# Patient Record
Sex: MALE | Race: BLACK OR AFRICAN AMERICAN | Employment: FULL TIME | URBAN - METROPOLITAN AREA
[De-identification: names, ages, dates, MRNs, and addresses within clinical notes are randomized per-mention and may not be internally consistent; named-entity substitution may affect disease eponyms.]

---

## 2023-02-20 ENCOUNTER — APPOINTMENT (OUTPATIENT)
Dept: CT IMAGING | Age: 53
End: 2023-02-20

## 2023-02-20 ENCOUNTER — APPOINTMENT (OUTPATIENT)
Dept: GENERAL RADIOLOGY | Age: 53
End: 2023-02-20

## 2023-02-20 ENCOUNTER — HOSPITAL ENCOUNTER (EMERGENCY)
Age: 53
Discharge: HOME | End: 2023-02-20
Attending: EMERGENCY MEDICINE

## 2023-02-20 VITALS
SYSTOLIC BLOOD PRESSURE: 130 MMHG | HEART RATE: 77 BPM | RESPIRATION RATE: 16 BRPM | WEIGHT: 160 LBS | TEMPERATURE: 98.2 F | BODY MASS INDEX: 24.25 KG/M2 | HEIGHT: 68 IN | DIASTOLIC BLOOD PRESSURE: 89 MMHG | OXYGEN SATURATION: 99 %

## 2023-02-20 DIAGNOSIS — E03.9 HYPOTHYROIDISM, UNSPECIFIED TYPE: Primary | ICD-10-CM

## 2023-02-20 DIAGNOSIS — R20.0 LEFT FACIAL NUMBNESS: ICD-10-CM

## 2023-02-20 LAB
ALBUMIN SERPL-MCNC: 2.8 G/DL (ref 3.5–5)
ALBUMIN/GLOB SERPL: 0.8 (ref 0.4–1.6)
ALP SERPL-CCNC: 45 U/L (ref 50–136)
ALT SERPL-CCNC: 20 U/L (ref 12–65)
ANION GAP SERPL CALC-SCNC: 2 MMOL/L (ref 2–11)
AST SERPL-CCNC: 27 U/L (ref 15–37)
BASOPHILS # BLD: 0.1 K/UL (ref 0–0.2)
BASOPHILS NFR BLD: 2 % (ref 0–2)
BILIRUB SERPL-MCNC: 0.3 MG/DL (ref 0.2–1.1)
BUN SERPL-MCNC: 15 MG/DL (ref 6–23)
CALCIUM SERPL-MCNC: 8.5 MG/DL (ref 8.3–10.4)
CHLORIDE SERPL-SCNC: 109 MMOL/L (ref 101–110)
CO2 SERPL-SCNC: 32 MMOL/L (ref 21–32)
CREAT SERPL-MCNC: 1.5 MG/DL (ref 0.8–1.5)
DIFFERENTIAL METHOD BLD: ABNORMAL
EOSINOPHIL # BLD: 0.2 K/UL (ref 0–0.8)
EOSINOPHIL NFR BLD: 4 % (ref 0.5–7.8)
ERYTHROCYTE [DISTWIDTH] IN BLOOD BY AUTOMATED COUNT: 13.7 % (ref 11.9–14.6)
GLOBULIN SER CALC-MCNC: 3.4 G/DL (ref 2.8–4.5)
GLUCOSE SERPL-MCNC: 111 MG/DL (ref 65–100)
HCT VFR BLD AUTO: 37.6 % (ref 41.1–50.3)
HGB BLD-MCNC: 12.4 G/DL (ref 13.6–17.2)
IMM GRANULOCYTES # BLD AUTO: 0 K/UL (ref 0–0.5)
IMM GRANULOCYTES NFR BLD AUTO: 0 % (ref 0–5)
INR PPP: 0.9
LYMPHOCYTES # BLD: 1.4 K/UL (ref 0.5–4.6)
LYMPHOCYTES NFR BLD: 27 % (ref 13–44)
MCH RBC QN AUTO: 31.5 PG (ref 26.1–32.9)
MCHC RBC AUTO-ENTMCNC: 33 G/DL (ref 31.4–35)
MCV RBC AUTO: 95.4 FL (ref 82–102)
MONOCYTES # BLD: 0.5 K/UL (ref 0.1–1.3)
MONOCYTES NFR BLD: 9 % (ref 4–12)
NEUTS SEG # BLD: 3 K/UL (ref 1.7–8.2)
NEUTS SEG NFR BLD: 58 % (ref 43–78)
NRBC # BLD: 0 K/UL (ref 0–0.2)
PLATELET # BLD AUTO: 301 K/UL (ref 150–450)
PMV BLD AUTO: 9.5 FL (ref 9.4–12.3)
POTASSIUM SERPL-SCNC: 4.5 MMOL/L (ref 3.5–5.1)
PROT SERPL-MCNC: 6.2 G/DL (ref 6.3–8.2)
PROTHROMBIN TIME: 12.1 SEC (ref 12.6–14.3)
RBC # BLD AUTO: 3.94 M/UL (ref 4.23–5.6)
SODIUM SERPL-SCNC: 143 MMOL/L (ref 133–143)
TROPONIN I SERPL HS-MCNC: 14.8 PG/ML (ref 0–14)
TSH, 3RD GENERATION: 22.2 UIU/ML (ref 0.36–3.74)
WBC # BLD AUTO: 5.1 K/UL (ref 4.3–11.1)

## 2023-02-20 PROCEDURE — 85025 COMPLETE CBC W/AUTO DIFF WBC: CPT

## 2023-02-20 PROCEDURE — 71045 X-RAY EXAM CHEST 1 VIEW: CPT

## 2023-02-20 PROCEDURE — 2580000003 HC RX 258: Performed by: EMERGENCY MEDICINE

## 2023-02-20 PROCEDURE — 70450 CT HEAD/BRAIN W/O DYE: CPT

## 2023-02-20 PROCEDURE — 93005 ELECTROCARDIOGRAM TRACING: CPT | Performed by: EMERGENCY MEDICINE

## 2023-02-20 PROCEDURE — 99285 EMERGENCY DEPT VISIT HI MDM: CPT

## 2023-02-20 PROCEDURE — 6360000004 HC RX CONTRAST MEDICATION: Performed by: EMERGENCY MEDICINE

## 2023-02-20 PROCEDURE — 84443 ASSAY THYROID STIM HORMONE: CPT

## 2023-02-20 PROCEDURE — 80053 COMPREHEN METABOLIC PANEL: CPT

## 2023-02-20 PROCEDURE — 84484 ASSAY OF TROPONIN QUANT: CPT

## 2023-02-20 PROCEDURE — 70498 CT ANGIOGRAPHY NECK: CPT

## 2023-02-20 PROCEDURE — 85610 PROTHROMBIN TIME: CPT

## 2023-02-20 RX ORDER — 0.9 % SODIUM CHLORIDE 0.9 %
100 INTRAVENOUS SOLUTION INTRAVENOUS
Status: COMPLETED | OUTPATIENT
Start: 2023-02-20 | End: 2023-02-20

## 2023-02-20 RX ORDER — SODIUM CHLORIDE 0.9 % (FLUSH) 0.9 %
10 SYRINGE (ML) INJECTION
Status: COMPLETED | OUTPATIENT
Start: 2023-02-20 | End: 2023-02-20

## 2023-02-20 RX ORDER — LEVOTHYROXINE SODIUM 0.05 MG/1
50 TABLET ORAL DAILY
Qty: 20 TABLET | Refills: 0 | Status: SHIPPED | OUTPATIENT
Start: 2023-02-20 | End: 2023-03-12

## 2023-02-20 RX ADMIN — SODIUM CHLORIDE 100 ML: 9 INJECTION, SOLUTION INTRAVENOUS at 15:13

## 2023-02-20 RX ADMIN — IOPAMIDOL 50 ML: 755 INJECTION, SOLUTION INTRAVENOUS at 15:12

## 2023-02-20 RX ADMIN — SODIUM CHLORIDE, PRESERVATIVE FREE 10 ML: 5 INJECTION INTRAVENOUS at 15:13

## 2023-02-20 ASSESSMENT — ENCOUNTER SYMPTOMS
BACK PAIN: 0
CONSTIPATION: 0
SHORTNESS OF BREATH: 0
COUGH: 0
DIARRHEA: 0
ABDOMINAL PAIN: 0
VOMITING: 0
COLOR CHANGE: 0
RHINORRHEA: 0
SORE THROAT: 0
NAUSEA: 0
VISUAL CHANGE: 1

## 2023-02-20 ASSESSMENT — LIFESTYLE VARIABLES
HOW MANY STANDARD DRINKS CONTAINING ALCOHOL DO YOU HAVE ON A TYPICAL DAY: PATIENT DOES NOT DRINK
HOW OFTEN DO YOU HAVE A DRINK CONTAINING ALCOHOL: NEVER

## 2023-02-20 ASSESSMENT — PAIN SCALES - GENERAL: PAINLEVEL_OUTOF10: 10

## 2023-02-20 ASSESSMENT — PAIN DESCRIPTION - LOCATION: LOCATION: SHOULDER

## 2023-02-20 ASSESSMENT — PAIN DESCRIPTION - DESCRIPTORS: DESCRIPTORS: JABBING

## 2023-02-20 ASSESSMENT — PAIN DESCRIPTION - ORIENTATION: ORIENTATION: LEFT

## 2023-02-20 NOTE — ED TRIAGE NOTES
Pt arrives via pov ambulatory c/o left facial numbness and right arm tingling. Pt reports unbalanced. All this started a week ago. Denies any neuro hx.  NIH 0.

## 2023-02-20 NOTE — ED TRIAGE NOTES
Patient arrives ambulatory to triage c/o L facial numbness x1 week, along with L eye vision changes. Also reports bilateral intermittent arm weakness, numbness, and pain. Denies headache. A+Ox4. Masked. NAD.

## 2023-02-20 NOTE — ED PROVIDER NOTES
Emergency Department Provider Note                   PCP:                None Provider               Age: 45 y.o. Sex: male       ICD-10-CM    1. Hypothyroidism, unspecified type  E03.9       2. Left facial numbness  R20.0           DISPOSITION Decision To Discharge 02/20/2023 03:48:31 PM       Medical Decision Making  45 y.o. patient  has no past medical history on file. Patient with left facial numbness and alternating right arm and left arm pain and numbness. History of thyroid disease not currently on any medication. No acute on CT or CTA. TSH is elevated. We will treat as such and discharged with medication at home.         Ordered and Reviewed Labs: Labs Reviewed  TROPONIN - Abnormal; Notable for the following components:     Troponin, High Sensitivity    14.8 (*)            All other components within normal limits  PROTIME-INR - Abnormal; Notable for the following components:     Protime                       12.1 (*)            All other components within normal limits  CBC WITH AUTO DIFFERENTIAL - Abnormal; Notable for the following components:      RBC                           3.94 (*)               Hemoglobin                    12.4 (*)               Hematocrit                    37.6 (*)            All other components within normal limits  COMPREHENSIVE METABOLIC PANEL - Abnormal; Notable for the following components:     Glucose                       111 (*)                Est, Glom Filt Rate           55 (*)                 Alk Phosphatase               45 (*)                 Total Protein                 6.2 (*)                Albumin                       2.8 (*)             All other components within normal limits  TSH - Abnormal; Notable for the following components:     TSH, 3RD GENERATION           22.200 (*)            All other components within normal limits    Imaging Independent Interpretation: CT HEAD WO CONTRAST   Final Result        No CT evidence of acute intracranial abnormality. CTA HEAD NECK W CONTRAST   Final Result        No acute arterial occlusive disease. No hemodynamically significant carotid    artery stenosis. XR CHEST 1 VIEW   Final Result        No acute airspace disease. EKG Independent Interpretation: EKG: normal sinus rhythm, nonspecific ST and T waves changes. Reassess: Doing well      Disposition: Discharge    Consideration of rx meds: Synthroid for thyroid. Reviewed current meds and recommend continue current home medications. Final DX: Acute hypothyroidism. Amount and/or Complexity of Data Reviewed  Labs: ordered. Radiology: ordered. ECG/medicine tests: ordered. Risk  Prescription drug management. Orders Placed This Encounter   Procedures    XR CHEST 1 VIEW    CT HEAD WO CONTRAST    CTA HEAD NECK W CONTRAST    Troponin    Protime-INR    CBC with Auto Differential    Comprehensive Metabolic Panel    TSH    EKG 12 Lead        Medications   0.9 % sodium chloride bolus (0 mLs IntraVENous Stopped 2/20/23 1550)   sodium chloride flush 0.9 % injection 10 mL (10 mLs IntraVENous Given 2/20/23 1513)   iopamidol (ISOVUE-370) 76 % injection 50 mL (50 mLs IntraVENous Given 2/20/23 1512)       New Prescriptions    LEVOTHYROXINE (SYNTHROID) 50 MCG TABLET    Take 1 tablet by mouth daily for 20 days        Margarita Ortega is a 45 y.o. male who presents to the Emergency Department with chief complaint of    Chief Complaint   Patient presents with    Extremity Weakness      Patient started 10 days ago with left facial numbness from the bottom of his eye to the top of his upper lip. Does not go to the side of the face or ear. Has been constant during this time. Has some slight blurry vision. No headache. Also complains of alternating right arm and left arm numbness tingling and pain. No specific trigger. With symptoms continuing comes in for evaluation.     The history is provided by the patient. No  was used. Extremity Weakness  Severity:  Mild  Duration:  10 days  Timing:  Intermittent  Progression:  Unchanged  Chronicity:  New  Relieved by:  Nothing  Worsened by:  Nothing  Associated symptoms: extremity numbness (arms intermittently), sensory-motor deficit (left face) and vision change (mild left eye)    Associated symptoms: no abdominal pain, no aphasia, no arthralgias, no chest pain, no cough, no diarrhea, no difficulty walking, no dizziness, no drooling, no dysuria, no fever, no headaches, no loss of consciousness, no myalgias, no nausea, no shortness of breath and no vomiting       Review of Systems   Constitutional:  Negative for chills and fever. HENT:  Negative for drooling, rhinorrhea and sore throat. Eyes:  Positive for visual disturbance. Respiratory:  Negative for cough and shortness of breath. Cardiovascular:  Negative for chest pain and palpitations. Gastrointestinal:  Negative for abdominal pain, constipation, diarrhea, nausea and vomiting. Genitourinary:  Negative for dysuria and hematuria. Musculoskeletal:  Negative for arthralgias, back pain, myalgias and neck pain. Skin:  Negative for color change and rash. Neurological:  Positive for numbness. Negative for dizziness, loss of consciousness, facial asymmetry, speech difficulty, weakness and headaches. Psychiatric/Behavioral:  Negative for confusion. All other systems reviewed and are negative. No past medical history on file. No past surgical history on file. No family history on file. Social History     Socioeconomic History    Marital status:         Allergies: Patient has no allergy information on record. Previous Medications    No medications on file        Vitals signs and nursing note reviewed.    Patient Vitals for the past 4 hrs:   Temp Pulse Resp BP SpO2   02/20/23 1500 -- -- -- 130/89 99 %   02/20/23 1250 98.2 °F (36.8 °C) 77 16 (!) 135/97 99 % Physical Exam  Vitals and nursing note reviewed. Constitutional:       Appearance: Normal appearance. HENT:      Head: Normocephalic and atraumatic. Eyes:      Extraocular Movements: Extraocular movements intact. Pupils: Pupils are equal, round, and reactive to light. Cardiovascular:      Rate and Rhythm: Normal rate and regular rhythm. Pulmonary:      Effort: Pulmonary effort is normal.      Breath sounds: Normal breath sounds. No wheezing. Abdominal:      General: Bowel sounds are normal.      Palpations: Abdomen is soft. Tenderness: There is no abdominal tenderness. Musculoskeletal:         General: No swelling or tenderness. Normal range of motion. Cervical back: Normal range of motion. No tenderness. Skin:     General: Skin is warm and dry. Neurological:      Mental Status: He is alert and oriented to person, place, and time. Cranial Nerves: No cranial nerve deficit. Sensory: Sensory deficit (left facial) present. Motor: No weakness. Procedures    ED EKG Interpretation  EKG was interpreted in the absence of a cardiologist.    Rate: 80  EKG Interpretation: EKG Interpretation: sinus rhythm  ST Segments: Nonspecific ST segments - NO STEMI      Results for orders placed or performed during the hospital encounter of 02/20/23   XR CHEST 1 VIEW    Narrative    EXAMINATION: XR CHEST 1 VIEW 2/20/2023 1:18 PM    ACCESSION NUMBER: [id number]    COMPARISON: None available    INDICATION: Shortness of breath. TECHNIQUE: A single view of the chest was obtained. FINDINGS:    Lungs well inflated. Subcentimeter probable calcified granuloma in the left  upper lung. No focal consolidation or edema. No pleural effusion or pneumothorax. Cardiomediastinal silhouette within normal limits. Impression    No acute airspace disease.        CT HEAD WO CONTRAST    Narrative    EXAMINATION: CT HEAD WO CONTRAST 2/20/2023 3:09 PM    ACCESSION NUMBER: [id number]    COMPARISON: None available    INDICATION: 57-year-old male with headache, left facial numbness for one week,  left vision changes. TECHNIQUE: Multiple-row detector helical CT examination of the head without  intravenous contrast.     Radiation dose reduction techniques were used for this study. Our CT scanners  use one or all of the following: Automated exposure control, adjustment of the  mA and/or kV according to patient size, iterative reconstruction. FINDINGS:    BRAIN: Mineralization in the bilateral globus pallidus. There is a mild degree of  scattered and confluent hypodense foci within the periventricular and deep white  matter, nonspecific but most commonly associated with chronic small vessel  ischemic changes. No evidence of acute intracranial hemorrhage. No midline shift  or mass lesion. No extra-axial fluid collections. No large vascular territory  infarct. The ventricles are within normal limits in size and configuration. The  basal cisterns are patent. VASCULAR: Unremarkable unenhanced appearance. SKULL: No acute calvarial fractures are evident. SINUSES/ORBITS: Partially imaged chronic appearing prominent deformity of the  medial left orbital floor with herniation of extraconal fat. The paranasal  sinuses are pneumatized, including the left maxillary sinus. Globes are  symmetric and intact. Impression    No CT evidence of acute intracranial abnormality. Partially imaged chronic appearing prominent left medial orbital floor fracture  with herniation of extraconal fat through the defect. Recommend clinical  correlation. CTA HEAD NECK W CONTRAST    Narrative    History: Left facial numbness. FINDINGS:    CT angiography was performed of the neck and head with contrast and  three-dimensional CT angiography reconstruction and reformat was performed. NASCET criteria as needed.  CT dose reduction was achieved through use of a  standardized protocol tailored for this examination and automatic exposure  control for dose modulation. IV Contrast:50 cc Isovue 370. Aortic arch is patent. Left subclavian artery is patent. Left vertebral artery  is patent. Right vertebral artery is patent. Left common carotid artery is patent. Left internal carotid artery is patent. Right common and internal carotid arteries are patent. Basilar artery is patent. Posterior cerebral arteries are patent. Anterior cerebral arteries are patent. Middle cerebral arteries are patent. Dural venous sinuses are patent. Impression    No acute arterial occlusive disease. No hemodynamically significant carotid  artery stenosis.      Troponin   Result Value Ref Range    Troponin, High Sensitivity 14.8 (H) 0 - 14 pg/mL   Protime-INR   Result Value Ref Range    Protime 12.1 (L) 12.6 - 14.3 sec    INR 0.9     CBC with Auto Differential   Result Value Ref Range    WBC 5.1 4.3 - 11.1 K/uL    RBC 3.94 (L) 4.23 - 5.6 M/uL    Hemoglobin 12.4 (L) 13.6 - 17.2 g/dL    Hematocrit 37.6 (L) 41.1 - 50.3 %    MCV 95.4 82 - 102 FL    MCH 31.5 26.1 - 32.9 PG    MCHC 33.0 31.4 - 35.0 g/dL    RDW 13.7 11.9 - 14.6 %    Platelets 303 673 - 217 K/uL    MPV 9.5 9.4 - 12.3 FL    nRBC 0.00 0.0 - 0.2 K/uL    Differential Type AUTOMATED      Seg Neutrophils 58 43 - 78 %    Lymphocytes 27 13 - 44 %    Monocytes 9 4.0 - 12.0 %    Eosinophils % 4 0.5 - 7.8 %    Basophils 2 0.0 - 2.0 %    Immature Granulocytes 0 0.0 - 5.0 %    Segs Absolute 3.0 1.7 - 8.2 K/UL    Absolute Lymph # 1.4 0.5 - 4.6 K/UL    Absolute Mono # 0.5 0.1 - 1.3 K/UL    Absolute Eos # 0.2 0.0 - 0.8 K/UL    Basophils Absolute 0.1 0.0 - 0.2 K/UL    Absolute Immature Granulocyte 0.0 0.0 - 0.5 K/UL   Comprehensive Metabolic Panel   Result Value Ref Range    Sodium 143 133 - 143 mmol/L    Potassium 4.5 3.5 - 5.1 mmol/L    Chloride 109 101 - 110 mmol/L    CO2 32 21 - 32 mmol/L    Anion Gap 2 2 - 11 mmol/L    Glucose 111 (H) 65 - 100 mg/dL    BUN 15 6 - 23 MG/DL    Creatinine 1.50 0.8 - 1.5 MG/DL    Est, Glom Filt Rate 55 (L) >60 ml/min/1.73m2    Calcium 8.5 8.3 - 10.4 MG/DL    Total Bilirubin 0.3 0.2 - 1.1 MG/DL    ALT 20 12 - 65 U/L    AST 27 15 - 37 U/L    Alk Phosphatase 45 (L) 50 - 136 U/L    Total Protein 6.2 (L) 6.3 - 8.2 g/dL    Albumin 2.8 (L) 3.5 - 5.0 g/dL    Globulin 3.4 2.8 - 4.5 g/dL    Albumin/Globulin Ratio 0.8 0.4 - 1.6     TSH   Result Value Ref Range    TSH, 3RD GENERATION 22.200 (H) 0.358 - 3.740 uIU/mL   EKG 12 Lead   Result Value Ref Range    Ventricular Rate 80 BPM    Atrial Rate 82 BPM    P-R Interval 142 ms    QRS Duration 86 ms    Q-T Interval 368 ms    QTc Calculation (Bazett) 424 ms    P Axis 76 degrees    R Axis 58 degrees    T Axis 54 degrees    Diagnosis Normal sinus rhythm         CT HEAD WO CONTRAST   Final Result      No CT evidence of acute intracranial abnormality. Partially imaged chronic appearing prominent left medial orbital floor fracture   with herniation of extraconal fat through the defect. Recommend clinical   correlation. CTA HEAD NECK W CONTRAST   Final Result      No acute arterial occlusive disease. No hemodynamically significant carotid   artery stenosis. XR CHEST 1 VIEW   Final Result      No acute airspace disease. NIH Stroke Scale  Interval: Baseline  Level of Consciousness (1a): Alert  LOC Questions (1b): Answers both correctly  LOC Commands (1c): Performs both tasks correctly  Best Gaze (2): Normal  Visual (3): (!) Partial hemianopia  Facial Palsy (4): Normal symmetrical movement  Motor Arm, Left (5a): No drift  Motor Arm, Right (5b): No drift  Motor Leg, Left (6a): No drift  Motor Leg, Right (6b):  No drift  Limb Ataxia (7): Absent  Sensory (8): (!) Mild to Moderate  Best Language (9): No aphasia  Dysarthria (10): Normal  Extinction and Inattention (11): (!) Visual, tactile, auditory, spatial, or personal inattention  Total: 3                Voice dictation software was used during the making of this note. This software is not perfect and grammatical and other typographical errors may be present. This note has not been completely proofread for errors.      Sanjuana Zhao III, MD  02/20/23 [id number]

## 2023-02-20 NOTE — ED NOTES
I have reviewed discharge instructions with the patient. The patient verbalized understanding. Patient left ED via Discharge Method: ambulatory to Home with self    Opportunity for questions and clarification provided. Patient given 1 scripts. To continue your aftercare when you leave the hospital, you may receive an automated call from our care team to check in on how you are doing. This is a free service and part of our promise to provide the best care and service to meet your aftercare needs.  If you have questions, or wish to unsubscribe from this service please call [phone]. Thank you for Choosing our [facility] Emergency Department.         Allen Kinsey RN  02/20/23 [id number]

## 2023-02-21 LAB
EKG ATRIAL RATE: 82 BPM
EKG DIAGNOSIS: NORMAL
EKG P AXIS: 76 DEGREES
EKG P-R INTERVAL: 142 MS
EKG Q-T INTERVAL: 368 MS
EKG QRS DURATION: 86 MS
EKG QTC CALCULATION (BAZETT): 424 MS
EKG R AXIS: 58 DEGREES
EKG T AXIS: 54 DEGREES
EKG VENTRICULAR RATE: 80 BPM